# Patient Record
Sex: MALE
[De-identification: names, ages, dates, MRNs, and addresses within clinical notes are randomized per-mention and may not be internally consistent; named-entity substitution may affect disease eponyms.]

---

## 2022-05-02 ENCOUNTER — NURSE TRIAGE (OUTPATIENT)
Dept: OTHER | Facility: CLINIC | Age: 62
End: 2022-05-02

## 2022-05-02 NOTE — TELEPHONE ENCOUNTER
Subjective: Caller states \"My left leg is hurting like crazy, if I walk a lot, the side of my calf gets excruciating pain and now it's into the back of my knee and goes into my hip\"     Current Symptoms: left leg pain, located in calf, back of knee, and up into hip. Slightly swollen on the side of the calf, worse with walking. Knee appears to be blue in color and feels hotter to touch on the left side. , states that his left foot is cool and pale    Denies sob/cp,     Onset: 1 days ago; gradual    Associated Symptoms: reduced activity    Pain Severity: 5/10; burning/pulling; constant    Temperature: Denies NA    What has been tried: tylenol    LMP: NA Pregnant: NA    Recommended disposition: Go to ED Now    Care advice provided, patient verbalizes understanding; denies any other questions or concerns; instructed to call back for any new or worsening symptoms. Patient/caller agrees to proceed to nearest Emergency Department    This triage is a result of a call to [address] [place] [address]. Please do not respond to the triage nurse through this encounter. Any subsequent communication should be directly with the patient.       Reason for Disposition   Entire foot is cool or blue in comparison to other side    Protocols used: LEG PAIN-ADULT-AH

## 2022-06-30 ENCOUNTER — NURSE TRIAGE (OUTPATIENT)
Dept: OTHER | Facility: CLINIC | Age: 62
End: 2022-06-30